# Patient Record
Sex: MALE | Race: OTHER | Employment: FULL TIME | URBAN - METROPOLITAN AREA
[De-identification: names, ages, dates, MRNs, and addresses within clinical notes are randomized per-mention and may not be internally consistent; named-entity substitution may affect disease eponyms.]

---

## 2019-02-15 ENCOUNTER — HOSPITAL ENCOUNTER (EMERGENCY)
Age: 29
Discharge: HOME | End: 2019-02-15
Attending: EMERGENCY MEDICINE
Payer: SELF-PAY

## 2019-02-15 ENCOUNTER — APPOINTMENT (OUTPATIENT)
Dept: ULTRASOUND IMAGING | Age: 29
End: 2019-02-15
Attending: NURSE PRACTITIONER
Payer: SELF-PAY

## 2019-02-15 VITALS
OXYGEN SATURATION: 100 % | BODY MASS INDEX: 25.41 KG/M2 | SYSTOLIC BLOOD PRESSURE: 147 MMHG | HEIGHT: 72 IN | WEIGHT: 187.61 LBS | TEMPERATURE: 98.1 F | RESPIRATION RATE: 16 BRPM | DIASTOLIC BLOOD PRESSURE: 95 MMHG | HEART RATE: 76 BPM

## 2019-02-15 DIAGNOSIS — Q82.9 SKIN ANOMALY: Primary | ICD-10-CM

## 2019-02-15 PROCEDURE — 99282 EMERGENCY DEPT VISIT SF MDM: CPT

## 2019-02-15 PROCEDURE — 76882 US LMTD JT/FCL EVL NVASC XTR: CPT

## 2019-02-15 PROCEDURE — 93971 EXTREMITY STUDY: CPT

## 2019-02-15 RX ORDER — CEPHALEXIN 500 MG/1
500 CAPSULE ORAL 4 TIMES DAILY
Qty: 28 CAP | Refills: 0 | Status: SHIPPED | OUTPATIENT
Start: 2019-02-15 | End: 2019-02-22

## 2019-02-15 NOTE — ED PROVIDER NOTES
EMERGENCY DEPARTMENT HISTORY AND PHYSICAL EXAM 
 
 
Date: 2/15/2019 Patient Name: Loni Rendon History of Presenting Illness Chief Complaint Patient presents with  Leg Pain  
  red inlamed knot on left thigh noticed yesterday morning. History Provided By: Patient HPI: Loni Rendon, 34 y.o. male with no PMHx presents ambulatory from home to the ED with cc of left leg bump that is red and tender to palpation. He first noticed it this am. No known trauma. Family hx positive for VTE. Pt denies fevers, chills, night sweats, chest pain, pressure, SOB, MUNOZ, PND, orthopnea, abdominal pain, n/v/d, melena, hematuria, dysuria, constipation, HA, dizziness, and syncope There are no other complaints, changes, or physical findings at this time. PCP: None No current facility-administered medications on file prior to encounter. No current outpatient medications on file prior to encounter. Past History Past Medical History: No past medical history on file. Past Surgical History: No past surgical history on file. Family History: No family history on file. Social History: 
Social History Tobacco Use  Smoking status: Current Every Day Smoker Substance Use Topics  Alcohol use: Yes  Drug use: Not on file Allergies: 
No Known Allergies Review of Systems Review of Systems Constitutional: Negative for activity change, appetite change, chills, diaphoresis, fatigue, fever and unexpected weight change. HENT: Negative for congestion, ear pain, rhinorrhea, sinus pressure, sore throat, tinnitus, trouble swallowing and voice change. Eyes: Negative for pain, discharge, redness and visual disturbance. Respiratory: Negative for apnea, cough, choking, chest tightness, shortness of breath, wheezing and stridor. Cardiovascular: Negative for chest pain, palpitations and leg swelling. Gastrointestinal: Negative for abdominal pain, constipation, nausea and vomiting. Endocrine: Negative for cold intolerance and heat intolerance. Genitourinary: Negative for difficulty urinating, dysuria, flank pain, hematuria, testicular pain and urgency. Musculoskeletal: Negative for arthralgias, back pain, gait problem, joint swelling, myalgias, neck pain and neck stiffness. Skin: Positive for color change. Negative for pallor, rash and wound. Allergic/Immunologic: Negative for immunocompromised state. Neurological: Negative for dizziness, tremors, syncope, weakness, light-headedness, numbness and headaches. Hematological: Does not bruise/bleed easily. Psychiatric/Behavioral: Negative for agitation, confusion and suicidal ideas. Physical Exam  
Physical Exam  
Constitutional: He is oriented to person, place, and time. He appears well-developed and well-nourished. No distress. HENT:  
Head: Atraumatic. Nose: Nose normal.  
Mouth/Throat: No oropharyngeal exudate. Eyes: Conjunctivae and EOM are normal. Right eye exhibits no discharge. Left eye exhibits no discharge. No scleral icterus. Neck: Normal range of motion. Neck supple. No JVD present. No tracheal deviation present. No thyromegaly present. Cardiovascular: Normal rate and regular rhythm. Exam reveals no gallop and no friction rub. No murmur heard. Pulmonary/Chest: Breath sounds normal. No stridor. No respiratory distress. He has no wheezes. He has no rales. He exhibits no tenderness. Abdominal: Soft. Bowel sounds are normal. He exhibits no distension and no mass. There is no tenderness. There is no rebound and no guarding. Musculoskeletal: Normal range of motion. He exhibits no edema or tenderness. Legs: 
Lymphadenopathy:  
  He has no cervical adenopathy. Neurological: He is alert and oriented to person, place, and time. Coordination normal.  
Skin: Skin is warm and dry. He is not diaphoretic. Psychiatric: He has a normal mood and affect. His behavior is normal.  
Nursing note and vitals reviewed. Diagnostic Study Results Labs - No results found for this or any previous visit (from the past 12 hour(s)). Radiologic Studies -  
US EXT NONVAS LT LTD Final Result IMPRESSION: No deep venous thrombosis or other abnormality identified. CT Results  (Last 48 hours) None CXR Results  (Last 48 hours) None Medical Decision Making I am the first provider for this patient. I reviewed the vital signs, available nursing notes, past medical history, past surgical history, family history and social history. Vital Signs-Reviewed the patient's vital signs. Patient Vitals for the past 12 hrs: 
 Temp Pulse Resp BP SpO2  
02/15/19 1455 98.1 °F (36.7 °C) 76 16 (!) 147/95 100 % Pulse Oximetry Analysis - 100% on RA Cardiac Monitor:  
Rate: 76 bpm 
 
Records Reviewed: Nursing Notes, Old Medical Records, Previous electrocardiograms, Previous Radiology Studies and Previous Laboratory Studies Provider Notes (Medical Decision Making):  
Red dermal changes ED Course:  
Initial assessment performed. The patients presenting problems have been discussed, and they are in agreement with the care plan formulated and outlined with them. I have encouraged them to ask questions as they arise throughout their visit. Stable, ambulatory pt in [facility] Critical Care Time:  
0 Disposition: 
Discharge to home with PCP and Gen Surg follow up PLAN: 
1. Discharge Medication List as of 2/15/2019  5:23 PM  
  
START taking these medications Details  
cephALEXin (KEFLEX) 500 mg capsule Take 1 Cap by mouth four (4) times daily for 7 days. , Print, Disp-28 Cap, R-0  
  
  
 
2. Follow-up Information Follow up With Specialties Details Why Contact Info  Jose Jaramillo MD General Surgery, Breast Surgery, Colon and Rectal Surgery, Endocrinology In 3 days  [address] [facility] 
[phone] [facility] EMERGENCY DEPT Emergency Medicine  As needed, If symptoms worsen [address] [facility] 
[phone] Return to ED if worse Diagnosis Clinical Impression: 1. Skin anomaly Attestations: 
 
Emmanuel Lo NP 
6:24 PM

## 2019-02-15 NOTE — ED NOTES
Also has a knot on right side face right cheek he has had for a while that has come and gone once in 2010 and once in 2015 He would like to get it checked,

## 2019-02-15 NOTE — DISCHARGE INSTRUCTIONS
We hope that we have addressed all of your medical concerns. The examination and treatment you received in the Emergency Department were for an emergent problem and were not intended as complete care. It is important that you follow up with your healthcare provider(s) for ongoing care. If your symptoms worsen or do not improve as expected, and you are unable to reach your usual health care provider(s), you should return to the Emergency Department. [facility] participate in nationally recognized quality of care measures. If your blood pressure is greater than 120/80, as reported below, we urge that you seek medical care to address the potential of high blood pressure, commonly known as hypertension. Hypertension can be hereditary or can be caused by certain medical conditions, pain, stress, or \"white coat syndrome. \"       Please make an appointment with your health care provider(s) for follow up of your Emergency Department visit. VITALS:   Patient Vitals for the past 8 hrs:   Temp Pulse Resp BP SpO2   02/15/19 1455 98.1 °F (36.7 °C) 76 16 (!) 147/95 100 %          Thank you for allowing us to provide you with medical care today. We realize that you have many choices for your emergency care needs. Please choose us in the future for any continued health care needs. Winston Smith NP              No results found for this or any previous visit (from the past 24 hour(s)). [address]    Result Date: 2/15/2019  EXAM:  DUPLEX LOWER EXT VENOUS LEFT, US EXT NONVAS LT LTD INDICATION: Painful knot on the thigh. COMPARISON: None. FINDINGS: Duplex Doppler sonography of the left lower extremity was performed from the groin to the calf. The left common femoral, femoral and popliteal veins are compressible with normal color-flow and wave forms and response to physiologic maneuvers including Valsalva and augmentation.  Sonography of the area of palpable abnormality was performed with a high frequency linear transducer and static images obtained and demonstrates no abnormality. IMPRESSION: No deep venous thrombosis or other abnormality identified.

## 2019-08-13 ENCOUNTER — OFFICE VISIT (OUTPATIENT)
Dept: INTERNAL MEDICINE CLINIC | Age: 29
End: 2019-08-13

## 2019-08-13 VITALS
HEART RATE: 62 BPM | RESPIRATION RATE: 18 BRPM | BODY MASS INDEX: 26.15 KG/M2 | HEIGHT: 72 IN | OXYGEN SATURATION: 98 % | DIASTOLIC BLOOD PRESSURE: 75 MMHG | TEMPERATURE: 98.3 F | WEIGHT: 193.1 LBS | SYSTOLIC BLOOD PRESSURE: 116 MMHG

## 2019-08-13 DIAGNOSIS — L98.9 FACIAL LESION: ICD-10-CM

## 2019-08-13 DIAGNOSIS — Z00.00 PHYSICAL EXAM: Primary | ICD-10-CM

## 2019-08-13 NOTE — PROGRESS NOTES
Chief Complaint   Patient presents with    New Patient     Patient office to have a \"bump\" on his right cheeck examined. He states that the bump has been present since 2010, however, went away for a while, but has since returned. Patient states that the bump does not hurt at all, but, there is a \"tingling\" sensation at times. Patients states that there is a family of blood clots (mother's family) and alcohol abuse (father's family). 1. Have you been to the ER, urgent care clinic since your last visit? Hospitalized since your last visit? No    2. Have you seen or consulted any other health care providers outside of the [address] [place] [address] since your last visit? Include any pap smears or colon screening.  No

## 2019-08-14 LAB
ALBUMIN SERPL-MCNC: 4.8 G/DL (ref 3.5–5.5)
ALBUMIN/GLOB SERPL: 1.8 {RATIO} (ref 1.2–2.2)
ALP SERPL-CCNC: 57 IU/L (ref 39–117)
ALT SERPL-CCNC: 24 IU/L (ref 0–44)
APPEARANCE UR: CLEAR
AST SERPL-CCNC: 15 IU/L (ref 0–40)
BASOPHILS # BLD AUTO: 0 X10E3/UL (ref 0–0.2)
BASOPHILS NFR BLD AUTO: 1 %
BILIRUB SERPL-MCNC: <0.2 MG/DL (ref 0–1.2)
BILIRUB UR QL STRIP: NEGATIVE
BUN SERPL-MCNC: 9 MG/DL (ref 6–20)
BUN/CREAT SERPL: 9 (ref 9–20)
CALCIUM SERPL-MCNC: 10 MG/DL (ref 8.7–10.2)
CHLORIDE SERPL-SCNC: 102 MMOL/L (ref 96–106)
CHOLEST SERPL-MCNC: 185 MG/DL (ref 100–199)
CO2 SERPL-SCNC: 21 MMOL/L (ref 20–29)
COLOR UR: YELLOW
CREAT SERPL-MCNC: 1.03 MG/DL (ref 0.76–1.27)
EOSINOPHIL # BLD AUTO: 0.1 X10E3/UL (ref 0–0.4)
EOSINOPHIL NFR BLD AUTO: 1 %
ERYTHROCYTE [DISTWIDTH] IN BLOOD BY AUTOMATED COUNT: 13.9 % (ref 12.3–15.4)
GLOBULIN SER CALC-MCNC: 2.6 G/DL (ref 1.5–4.5)
GLUCOSE SERPL-MCNC: 99 MG/DL (ref 65–99)
GLUCOSE UR QL: NEGATIVE
HCT VFR BLD AUTO: 43.5 % (ref 37.5–51)
HDLC SERPL-MCNC: 75 MG/DL
HGB BLD-MCNC: 14.4 G/DL (ref 13–17.7)
HGB UR QL STRIP: NEGATIVE
IMM GRANULOCYTES # BLD AUTO: 0 X10E3/UL (ref 0–0.1)
IMM GRANULOCYTES NFR BLD AUTO: 0 %
KETONES UR QL STRIP: NEGATIVE
LDLC SERPL CALC-MCNC: 96 MG/DL (ref 0–99)
LEUKOCYTE ESTERASE UR QL STRIP: NEGATIVE
LYMPHOCYTES # BLD AUTO: 3 X10E3/UL (ref 0.7–3.1)
LYMPHOCYTES NFR BLD AUTO: 60 %
MCH RBC QN AUTO: 27.1 PG (ref 26.6–33)
MCHC RBC AUTO-ENTMCNC: 33.1 G/DL (ref 31.5–35.7)
MCV RBC AUTO: 82 FL (ref 79–97)
MICRO URNS: NORMAL
MONOCYTES # BLD AUTO: 0 X10E3/UL (ref 0.1–0.9)
MONOCYTES NFR BLD AUTO: 0 %
NEUTROPHILS # BLD AUTO: 1.9 X10E3/UL (ref 1.4–7)
NEUTROPHILS NFR BLD AUTO: 38 %
NITRITE UR QL STRIP: NEGATIVE
PH UR STRIP: 7 [PH] (ref 5–7.5)
PLATELET # BLD AUTO: 272 X10E3/UL (ref 150–450)
POTASSIUM SERPL-SCNC: 4.7 MMOL/L (ref 3.5–5.2)
PROT SERPL-MCNC: 7.4 G/DL (ref 6–8.5)
PROT UR QL STRIP: NEGATIVE
RBC # BLD AUTO: 5.32 X10E6/UL (ref 4.14–5.8)
SODIUM SERPL-SCNC: 138 MMOL/L (ref 134–144)
SP GR UR: 1.02 (ref 1–1.03)
TRIGL SERPL-MCNC: 68 MG/DL (ref 0–149)
UROBILINOGEN UR STRIP-MCNC: 0.2 MG/DL (ref 0.2–1)
VLDLC SERPL CALC-MCNC: 14 MG/DL (ref 5–40)
WBC # BLD AUTO: 5 X10E3/UL (ref 3.4–10.8)

## 2019-08-18 PROBLEM — L98.9 FACIAL LESION: Status: ACTIVE | Noted: 2019-08-18

## 2019-08-18 NOTE — PROGRESS NOTES
[address] [facility]  [place] [address]  [address] [place] [address]  Phone:  [phone]  Fax: [phone]       Chief Complaint   Patient presents with    New Patient     Patient office to have a \"bump\" on his right cheeck examined. He states that the bump has been present since 2010, however, went away for a while, but has since returned. Patient states that the bump does not hurt at all, but, there is a \"tingling\" sensation at times. Patients states that there is a family of blood clots (mother's family) and alcohol abuse (father's family). .      SUBJECTIVE:    Vasiliy Benedict is a 34 y.o. male Comes in as a new patient. He comes in for physical examination. Chief complaint, however is a lesion on the right side of his jaw, which he has had now for well  over a year. He states it is enlarging slightly. In reality he has had it for several years, but it decreased in size for a period of time, only to recur again. It is non tender. There is no history of trauma. Past Medical History:   Diagnosis Date    Psychotic disorder [facility])     Requiring hospitalization short-term------ age 32     History reviewed. No pertinent surgical history.   No Known Allergies      REVIEW OF SYSTEMS:  General: negative for - chills or fever  ENT: negative for - headaches, nasal congestion or tinnitus  Respiratory: negative for - cough, hemoptysis, shortness of breath or wheezing  Cardiovascular : negative for - chest pain, edema, palpitations or shortness of breath  Gastrointestinal: negative for - abdominal pain, blood in stools, heartburn or nausea/vomiting  Genito-Urinary: no dysuria, trouble voiding, or hematuria  Musculoskeletal: negative for - gait disturbance, joint pain, joint stiffness or joint swelling  Neurological: no TIA or stroke symptoms  Hematologic: no bruises, no bleeding, no swollen glands  Integument: no lumps, mole changes, nail changes or rash  Endocrine: no malaise/lethargy or unexpected weight changes      Social History     Socioeconomic History    Marital status: SINGLE     Spouse name: Not on file    Number of children: 3    Years of education: Not on file    Highest education level: Not on file   Occupational History    Occupation: Unemployed   Tobacco Use    Smoking status: Former Smoker    Smokeless tobacco: Never Used   Substance and Sexual Activity    Alcohol use: Yes     Comment: social    Drug use: Not Currently    Sexual activity: Yes     Partners: Female     Birth control/protection: Condom     Family History   Problem Relation Age of Onset    Seizures Mother        OBJECTIVE:    Visit Vitals  /75   Pulse 62   Temp 98.3 °F (36.8 °C) (Oral)   Resp 18   Ht 6' (1.829 m)   Wt 193 lb 1.6 oz (87.6 kg)   SpO2 98%   BMI 26.19 kg/m²     CONSTITUTIONAL: well , well nourished, appears age appropriate  EYES: perrla, eom intact  ENMT:moist mucous membranes, pharynx clear, firm nodular structure right side of jaw approximately 1 cm in diameter, nontender  NECK: supple. Thyroid normal  RESPIRATORY: Chest: clear to ascultation and percussion   CARDIOVASCULAR: Heart: regular rate and rhythm  GASTROINTESTINAL: Abdomen: soft, bowel sounds active  HEMATOLOGIC: no pathological lymph nodes palpated  MUSCULOSKELETAL: Extremities: no edema, pulse 1+   INTEGUMENT: No unusual rashes or suspicious skin lesions noted. Nails appear normal.  NEUROLOGIC: non-focal exam   MENTAL STATUS: alert and oriented, appropriate affect      ASSESSMENT:  1. Physical exam    2. Facial lesion        PLAN:    1. The patient's general physical health appears to be good. 2. He will be referred to ENT physician regarding the facial lesion. I am not entirely sure of the origin. This could be indirectly related to a plugged duct from a salivary gland.     .  Orders Placed This Encounter    CBC WITH AUTOMATED DIFF    LIPID PANEL    METABOLIC PANEL, COMPREHENSIVE    URINALYSIS W/ RFLX Latia Macedo MD

## 2020-03-16 ENCOUNTER — OFFICE VISIT (OUTPATIENT)
Dept: INTERNAL MEDICINE CLINIC | Age: 30
End: 2020-03-16

## 2020-03-16 VITALS
DIASTOLIC BLOOD PRESSURE: 77 MMHG | HEIGHT: 72 IN | RESPIRATION RATE: 18 BRPM | HEART RATE: 82 BPM | WEIGHT: 199.5 LBS | OXYGEN SATURATION: 98 % | BODY MASS INDEX: 27.02 KG/M2 | TEMPERATURE: 98.6 F | SYSTOLIC BLOOD PRESSURE: 128 MMHG

## 2020-03-16 DIAGNOSIS — L98.9 FACIAL LESION: ICD-10-CM

## 2020-03-16 DIAGNOSIS — F32.9 REACTIVE DEPRESSION: Primary | ICD-10-CM

## 2020-03-16 NOTE — LETTER
3/16/2020 1:11 PM 
 
Mr. Pérez Gonzales [address] [facility] [address] Patient has a history of depression. Currently has an emotional support animal which is assisting with his current psychological status in a positive manner. This needs to continue. Sincerely, Jatin Collins MD

## 2020-03-16 NOTE — PROGRESS NOTES
Chief Complaint   Patient presents with    Other     Patient requesting for housing so that he may keep his pet for emotional support. 1. Have you been to the ER, urgent care clinic since your last visit? Hospitalized since your last visit? No    2. Have you seen or consulted any other health care providers outside of the [address] [place] [address] since your last visit? Include any pap smears or colon screening.  No

## 2020-03-16 NOTE — PROGRESS NOTES
Chief Complaint   Patient presents with    Other     Patient requesting for housing so that he may keep his pet for emotional support. .      SUBECTIVE:    Usman Johnson is a 27 y.o. male Comes in for return visit stating that he has a history of depression, having been hospitalized at [facility] several years ago. He stopped taking his antidepressant, which was Wellbutrin, because it did not agree with him. He has been using other homeopathic measures and has done reasonably well. One of the things he has been using is an emotional support animal, which is a dog he has raised. He is living in an apartment and needs a statement stating that he needs to continue with the presence of the animal in view of his psychological status. He has a lesion on his jaw that he needs to have repaired, but he has to wait until he gets insurance. Past Medical History:   Diagnosis Date    Psychotic disorder [facility])     Requiring hospitalization short-term------ age 32     History reviewed. No pertinent surgical history.   No Known Allergies    REVIEW OF SYSTEMS:  Review of Systems - Negative except   ENT ROS: negative for - headaches, hearing change, nasal congestion, oral lesions, tinnitus, visual changes or   Respiratory ROS: no cough, shortness of breath, or wheezing  Cardiovascular ROS: no chest pain or dyspnea on exertion  Gastrointestinal ROS: no abdominal pain, change in bowel habits, or black or blood  Genito-Urinary ROS: no dysuria, trouble voiding, or hematuria  Musculoskeletal ROS: negative  Neurological ROS: no TIA or stroke symptoms      Social History     Socioeconomic History    Marital status: UNKNOWN     Spouse name: Not on file    Number of children: 4    Years of education: Not on file    Highest education level: Not on file   Occupational History    Occupation: Unemployed   Tobacco Use    Smoking status: Former Smoker    Smokeless tobacco: Never Used   Substance and Sexual Activity    Alcohol use: Yes     Comment: social    Drug use: Not Currently    Sexual activity: Yes     Partners: Female     Birth control/protection: Condom   r  Family History   Problem Relation Age of Onset    Seizures Mother        OBJECTIVE:  Visit Vitals  /77   Pulse 82   Temp 98.6 °F (37 °C) (Oral)   Resp 18   Ht 6' (1.829 m)   Wt 199 lb 8 oz (90.5 kg)   SpO2 98%   BMI 27.06 kg/m²     ENT: perrla,  eom intact, nodule right side of face just above the mandible  NECK: supple. Thyroid normal, no JVD  CHEST: clear to ascultation and percussion   HEART: regular rate and rhythm  ABD: soft, bowel sounds active,   EXTREMITIES: no edema, pulse 1+  INTEGUMENT: clear      ASSESSMENT:  1. Reactive depression    2. Facial lesion        PLAN:    1. The patient has a history of depression and appears to be reasonably stable. It appears that the support animal is having some meaningful impact on his emotional status. A letter will be dictated regarding this. 2. He needs to get the lesion removed from his face, which he plans to do in the near future. Follow-up and Dispositions    · Return in about 6 months (around 9/16/2020).            Courtney Medina MD

## 2020-08-07 ENCOUNTER — HOSPITAL ENCOUNTER (OUTPATIENT)
Dept: PREADMISSION TESTING | Age: 30
Discharge: HOME | End: 2020-08-07
Payer: COMMERCIAL

## 2020-08-07 VITALS
HEIGHT: 72 IN | TEMPERATURE: 98.4 F | SYSTOLIC BLOOD PRESSURE: 138 MMHG | DIASTOLIC BLOOD PRESSURE: 77 MMHG | OXYGEN SATURATION: 99 % | HEART RATE: 71 BPM | RESPIRATION RATE: 18 BRPM | BODY MASS INDEX: 26.87 KG/M2 | WEIGHT: 198.41 LBS

## 2020-08-07 LAB
BASOPHILS # BLD: 0 K/UL (ref 0–0.1)
BASOPHILS NFR BLD: 1 % (ref 0–1)
DIFFERENTIAL METHOD BLD: NORMAL
EOSINOPHIL # BLD: 0.1 K/UL (ref 0–0.4)
EOSINOPHIL NFR BLD: 1 % (ref 0–7)
ERYTHROCYTE [DISTWIDTH] IN BLOOD BY AUTOMATED COUNT: 12.6 % (ref 11.5–14.5)
HCT VFR BLD AUTO: 41.8 % (ref 36.6–50.3)
HGB BLD-MCNC: 13.2 G/DL (ref 12.1–17)
IMM GRANULOCYTES # BLD AUTO: 0 K/UL (ref 0–0.04)
IMM GRANULOCYTES NFR BLD AUTO: 0 % (ref 0–0.5)
LYMPHOCYTES # BLD: 2 K/UL (ref 0.8–3.5)
LYMPHOCYTES NFR BLD: 29 % (ref 12–49)
MCH RBC QN AUTO: 26.4 PG (ref 26–34)
MCHC RBC AUTO-ENTMCNC: 31.6 G/DL (ref 30–36.5)
MCV RBC AUTO: 83.6 FL (ref 80–99)
MONOCYTES # BLD: 0.7 K/UL (ref 0–1)
MONOCYTES NFR BLD: 11 % (ref 5–13)
NEUTS SEG # BLD: 4 K/UL (ref 1.8–8)
NEUTS SEG NFR BLD: 58 % (ref 32–75)
NRBC # BLD: 0 K/UL (ref 0–0.01)
NRBC BLD-RTO: 0 PER 100 WBC
PLATELET # BLD AUTO: 284 K/UL (ref 150–400)
PMV BLD AUTO: 10.6 FL (ref 8.9–12.9)
RBC # BLD AUTO: 5 M/UL (ref 4.1–5.7)
WBC # BLD AUTO: 6.9 K/UL (ref 4.1–11.1)

## 2020-08-07 PROCEDURE — 85025 COMPLETE CBC W/AUTO DIFF WBC: CPT

## 2020-08-07 RX ORDER — ASCORBIC ACID 250 MG
250 TABLET ORAL DAILY
COMMUNITY
End: 2022-03-23

## 2020-08-13 ENCOUNTER — HOSPITAL ENCOUNTER (OUTPATIENT)
Dept: PREADMISSION TESTING | Age: 30
Discharge: HOME | End: 2020-08-13
Payer: COMMERCIAL

## 2020-08-13 DIAGNOSIS — Z01.812 PRE-PROCEDURE LAB EXAM: ICD-10-CM

## 2020-08-13 PROCEDURE — 87635 SARS-COV-2 COVID-19 AMP PRB: CPT

## 2020-08-14 LAB — SARS-COV-2, COV2NT: NOT DETECTED

## 2020-08-16 ENCOUNTER — ANESTHESIA EVENT (OUTPATIENT)
Dept: SURGERY | Age: 30
End: 2020-08-16
Payer: COMMERCIAL

## 2020-08-17 ENCOUNTER — HOSPITAL ENCOUNTER (OUTPATIENT)
Age: 30
Setting detail: OUTPATIENT SURGERY
Discharge: HOME | End: 2020-08-17
Attending: OTOLARYNGOLOGY | Admitting: OTOLARYNGOLOGY
Payer: COMMERCIAL

## 2020-08-17 ENCOUNTER — ANESTHESIA (OUTPATIENT)
Dept: SURGERY | Age: 30
End: 2020-08-17
Payer: COMMERCIAL

## 2020-08-17 VITALS
BODY MASS INDEX: 26.87 KG/M2 | SYSTOLIC BLOOD PRESSURE: 116 MMHG | TEMPERATURE: 97.7 F | HEART RATE: 81 BPM | HEIGHT: 72 IN | OXYGEN SATURATION: 98 % | WEIGHT: 198.41 LBS | DIASTOLIC BLOOD PRESSURE: 84 MMHG | RESPIRATION RATE: 22 BRPM

## 2020-08-17 PROCEDURE — 76010000138 HC OR TIME 0.5 TO 1 HR: Performed by: OTOLARYNGOLOGY

## 2020-08-17 PROCEDURE — 77030019908 HC STETH ESOPH SIMS -A: Performed by: ANESTHESIOLOGY

## 2020-08-17 PROCEDURE — 74011000250 HC RX REV CODE- 250: Performed by: OTOLARYNGOLOGY

## 2020-08-17 PROCEDURE — 74011250636 HC RX REV CODE- 250/636: Performed by: NURSE ANESTHETIST, CERTIFIED REGISTERED

## 2020-08-17 PROCEDURE — 77030040922 HC BLNKT HYPOTHRM STRY -A

## 2020-08-17 PROCEDURE — 74011000258 HC RX REV CODE- 258: Performed by: NURSE ANESTHETIST, CERTIFIED REGISTERED

## 2020-08-17 PROCEDURE — 76210000006 HC OR PH I REC 0.5 TO 1 HR: Performed by: OTOLARYNGOLOGY

## 2020-08-17 PROCEDURE — 74011250637 HC RX REV CODE- 250/637: Performed by: ANESTHESIOLOGY

## 2020-08-17 PROCEDURE — 77030031139 HC SUT VCRL2 J&J -A: Performed by: OTOLARYNGOLOGY

## 2020-08-17 PROCEDURE — 77030008683 HC TU ET CUF COVD -A: Performed by: ANESTHESIOLOGY

## 2020-08-17 PROCEDURE — 74011250636 HC RX REV CODE- 250/636: Performed by: ANESTHESIOLOGY

## 2020-08-17 PROCEDURE — 88304 TISSUE EXAM BY PATHOLOGIST: CPT

## 2020-08-17 PROCEDURE — 77030040356 HC CORD BPLR FRCP COVD -A: Performed by: OTOLARYNGOLOGY

## 2020-08-17 PROCEDURE — 77030040361 HC SLV COMPR DVT MDII -B: Performed by: OTOLARYNGOLOGY

## 2020-08-17 PROCEDURE — 77030019615 HC ELCTRD EMG NDL MEDT -B: Performed by: OTOLARYNGOLOGY

## 2020-08-17 PROCEDURE — 76210000020 HC REC RM PH II FIRST 0.5 HR: Performed by: OTOLARYNGOLOGY

## 2020-08-17 PROCEDURE — 74011250636 HC RX REV CODE- 250/636: Performed by: OTOLARYNGOLOGY

## 2020-08-17 PROCEDURE — 77030002974 HC SUT PLN J&J -A: Performed by: OTOLARYNGOLOGY

## 2020-08-17 PROCEDURE — 76060000032 HC ANESTHESIA 0.5 TO 1 HR: Performed by: OTOLARYNGOLOGY

## 2020-08-17 PROCEDURE — 74011000250 HC RX REV CODE- 250: Performed by: NURSE ANESTHETIST, CERTIFIED REGISTERED

## 2020-08-17 RX ORDER — SODIUM CHLORIDE 0.9 % (FLUSH) 0.9 %
5-40 SYRINGE (ML) INJECTION AS NEEDED
Status: DISCONTINUED | OUTPATIENT
Start: 2020-08-17 | End: 2020-08-17 | Stop reason: HOSPADM

## 2020-08-17 RX ORDER — MIDAZOLAM HYDROCHLORIDE 1 MG/ML
INJECTION, SOLUTION INTRAMUSCULAR; INTRAVENOUS AS NEEDED
Status: DISCONTINUED | OUTPATIENT
Start: 2020-08-17 | End: 2020-08-17 | Stop reason: HOSPADM

## 2020-08-17 RX ORDER — FENTANYL CITRATE 50 UG/ML
INJECTION, SOLUTION INTRAMUSCULAR; INTRAVENOUS AS NEEDED
Status: DISCONTINUED | OUTPATIENT
Start: 2020-08-17 | End: 2020-08-17 | Stop reason: HOSPADM

## 2020-08-17 RX ORDER — SODIUM CHLORIDE 0.9 % (FLUSH) 0.9 %
5-40 SYRINGE (ML) INJECTION EVERY 8 HOURS
Status: DISCONTINUED | OUTPATIENT
Start: 2020-08-17 | End: 2020-08-17 | Stop reason: HOSPADM

## 2020-08-17 RX ORDER — SODIUM CHLORIDE, SODIUM LACTATE, POTASSIUM CHLORIDE, CALCIUM CHLORIDE 600; 310; 30; 20 MG/100ML; MG/100ML; MG/100ML; MG/100ML
1000 INJECTION, SOLUTION INTRAVENOUS CONTINUOUS
Status: DISCONTINUED | OUTPATIENT
Start: 2020-08-17 | End: 2020-08-17 | Stop reason: HOSPADM

## 2020-08-17 RX ORDER — LIDOCAINE HYDROCHLORIDE 20 MG/ML
INJECTION, SOLUTION EPIDURAL; INFILTRATION; INTRACAUDAL; PERINEURAL AS NEEDED
Status: DISCONTINUED | OUTPATIENT
Start: 2020-08-17 | End: 2020-08-17 | Stop reason: HOSPADM

## 2020-08-17 RX ORDER — DIPHENHYDRAMINE HYDROCHLORIDE 50 MG/ML
12.5 INJECTION, SOLUTION INTRAMUSCULAR; INTRAVENOUS AS NEEDED
Status: DISCONTINUED | OUTPATIENT
Start: 2020-08-17 | End: 2020-08-17 | Stop reason: HOSPADM

## 2020-08-17 RX ORDER — OXYCODONE HYDROCHLORIDE 5 MG/1
5 TABLET ORAL AS NEEDED
Status: DISCONTINUED | OUTPATIENT
Start: 2020-08-17 | End: 2020-08-18 | Stop reason: HOSPADM

## 2020-08-17 RX ORDER — MORPHINE SULFATE 2 MG/ML
2 INJECTION, SOLUTION INTRAMUSCULAR; INTRAVENOUS
Status: CANCELLED | OUTPATIENT
Start: 2020-08-17

## 2020-08-17 RX ORDER — ACETAMINOPHEN 325 MG/1
650 TABLET ORAL ONCE
Status: COMPLETED | OUTPATIENT
Start: 2020-08-17 | End: 2020-08-17

## 2020-08-17 RX ORDER — MIDAZOLAM HYDROCHLORIDE 1 MG/ML
1 INJECTION, SOLUTION INTRAMUSCULAR; INTRAVENOUS AS NEEDED
Status: DISCONTINUED | OUTPATIENT
Start: 2020-08-17 | End: 2020-08-17 | Stop reason: HOSPADM

## 2020-08-17 RX ORDER — ONDANSETRON 2 MG/ML
INJECTION INTRAMUSCULAR; INTRAVENOUS AS NEEDED
Status: DISCONTINUED | OUTPATIENT
Start: 2020-08-17 | End: 2020-08-17 | Stop reason: HOSPADM

## 2020-08-17 RX ORDER — CEFAZOLIN SODIUM/WATER 2 G/20 ML
2 SYRINGE (ML) INTRAVENOUS ONCE
Status: COMPLETED | OUTPATIENT
Start: 2020-08-17 | End: 2020-08-17

## 2020-08-17 RX ORDER — FENTANYL CITRATE 50 UG/ML
25 INJECTION, SOLUTION INTRAMUSCULAR; INTRAVENOUS
Status: DISCONTINUED | OUTPATIENT
Start: 2020-08-17 | End: 2020-08-18 | Stop reason: HOSPADM

## 2020-08-17 RX ORDER — EPHEDRINE SULFATE/0.9% NACL/PF 50 MG/5 ML
5 SYRINGE (ML) INTRAVENOUS AS NEEDED
Status: DISCONTINUED | OUTPATIENT
Start: 2020-08-17 | End: 2020-08-18 | Stop reason: HOSPADM

## 2020-08-17 RX ORDER — HYDROMORPHONE HYDROCHLORIDE 1 MG/ML
0.2 INJECTION, SOLUTION INTRAMUSCULAR; INTRAVENOUS; SUBCUTANEOUS
Status: DISCONTINUED | OUTPATIENT
Start: 2020-08-17 | End: 2020-08-17 | Stop reason: HOSPADM

## 2020-08-17 RX ORDER — SODIUM CHLORIDE, SODIUM LACTATE, POTASSIUM CHLORIDE, CALCIUM CHLORIDE 600; 310; 30; 20 MG/100ML; MG/100ML; MG/100ML; MG/100ML
100 INJECTION, SOLUTION INTRAVENOUS CONTINUOUS
Status: DISCONTINUED | OUTPATIENT
Start: 2020-08-17 | End: 2020-08-18 | Stop reason: HOSPADM

## 2020-08-17 RX ORDER — SODIUM CHLORIDE 9 MG/ML
25 INJECTION, SOLUTION INTRAVENOUS CONTINUOUS
Status: DISCONTINUED | OUTPATIENT
Start: 2020-08-17 | End: 2020-08-18 | Stop reason: HOSPADM

## 2020-08-17 RX ORDER — KETOROLAC TROMETHAMINE 30 MG/ML
INJECTION, SOLUTION INTRAMUSCULAR; INTRAVENOUS AS NEEDED
Status: DISCONTINUED | OUTPATIENT
Start: 2020-08-17 | End: 2020-08-17 | Stop reason: HOSPADM

## 2020-08-17 RX ORDER — SODIUM CHLORIDE 0.9 % (FLUSH) 0.9 %
5-40 SYRINGE (ML) INJECTION AS NEEDED
Status: CANCELLED | OUTPATIENT
Start: 2020-08-17

## 2020-08-17 RX ORDER — OXYCODONE AND ACETAMINOPHEN 5; 325 MG/1; MG/1
1 TABLET ORAL
Status: CANCELLED | OUTPATIENT
Start: 2020-08-17

## 2020-08-17 RX ORDER — ROPIVACAINE HYDROCHLORIDE 5 MG/ML
150 INJECTION, SOLUTION EPIDURAL; INFILTRATION; PERINEURAL AS NEEDED
Status: DISCONTINUED | OUTPATIENT
Start: 2020-08-17 | End: 2020-08-17 | Stop reason: HOSPADM

## 2020-08-17 RX ORDER — MIDAZOLAM HYDROCHLORIDE 1 MG/ML
0.5 INJECTION, SOLUTION INTRAMUSCULAR; INTRAVENOUS
Status: DISCONTINUED | OUTPATIENT
Start: 2020-08-17 | End: 2020-08-18 | Stop reason: HOSPADM

## 2020-08-17 RX ORDER — SUCCINYLCHOLINE CHLORIDE 20 MG/ML
INJECTION INTRAMUSCULAR; INTRAVENOUS AS NEEDED
Status: DISCONTINUED | OUTPATIENT
Start: 2020-08-17 | End: 2020-08-17 | Stop reason: HOSPADM

## 2020-08-17 RX ORDER — PROPOFOL 10 MG/ML
INJECTION, EMULSION INTRAVENOUS AS NEEDED
Status: DISCONTINUED | OUTPATIENT
Start: 2020-08-17 | End: 2020-08-17 | Stop reason: HOSPADM

## 2020-08-17 RX ORDER — ONDANSETRON 2 MG/ML
4 INJECTION INTRAMUSCULAR; INTRAVENOUS AS NEEDED
Status: DISCONTINUED | OUTPATIENT
Start: 2020-08-17 | End: 2020-08-18 | Stop reason: HOSPADM

## 2020-08-17 RX ORDER — DEXAMETHASONE SODIUM PHOSPHATE 4 MG/ML
INJECTION, SOLUTION INTRA-ARTICULAR; INTRALESIONAL; INTRAMUSCULAR; INTRAVENOUS; SOFT TISSUE AS NEEDED
Status: DISCONTINUED | OUTPATIENT
Start: 2020-08-17 | End: 2020-08-17 | Stop reason: HOSPADM

## 2020-08-17 RX ORDER — ROCURONIUM BROMIDE 10 MG/ML
INJECTION, SOLUTION INTRAVENOUS AS NEEDED
Status: DISCONTINUED | OUTPATIENT
Start: 2020-08-17 | End: 2020-08-17 | Stop reason: HOSPADM

## 2020-08-17 RX ORDER — FENTANYL CITRATE 50 UG/ML
50 INJECTION, SOLUTION INTRAMUSCULAR; INTRAVENOUS AS NEEDED
Status: DISCONTINUED | OUTPATIENT
Start: 2020-08-17 | End: 2020-08-17 | Stop reason: HOSPADM

## 2020-08-17 RX ORDER — MORPHINE SULFATE 10 MG/ML
2 INJECTION, SOLUTION INTRAMUSCULAR; INTRAVENOUS
Status: DISCONTINUED | OUTPATIENT
Start: 2020-08-17 | End: 2020-08-18 | Stop reason: HOSPADM

## 2020-08-17 RX ORDER — LIDOCAINE HYDROCHLORIDE 10 MG/ML
0.1 INJECTION, SOLUTION EPIDURAL; INFILTRATION; INTRACAUDAL; PERINEURAL AS NEEDED
Status: DISCONTINUED | OUTPATIENT
Start: 2020-08-17 | End: 2020-08-17 | Stop reason: HOSPADM

## 2020-08-17 RX ORDER — LIDOCAINE HYDROCHLORIDE AND EPINEPHRINE 10; 10 MG/ML; UG/ML
INJECTION, SOLUTION INFILTRATION; PERINEURAL AS NEEDED
Status: DISCONTINUED | OUTPATIENT
Start: 2020-08-17 | End: 2020-08-17 | Stop reason: HOSPADM

## 2020-08-17 RX ORDER — SODIUM CHLORIDE, SODIUM LACTATE, POTASSIUM CHLORIDE, CALCIUM CHLORIDE 600; 310; 30; 20 MG/100ML; MG/100ML; MG/100ML; MG/100ML
INJECTION, SOLUTION INTRAVENOUS
Status: DISCONTINUED | OUTPATIENT
Start: 2020-08-17 | End: 2020-08-17 | Stop reason: HOSPADM

## 2020-08-17 RX ORDER — ONDANSETRON 2 MG/ML
4 INJECTION INTRAMUSCULAR; INTRAVENOUS
Status: CANCELLED | OUTPATIENT
Start: 2020-08-17

## 2020-08-17 RX ORDER — SODIUM CHLORIDE 0.9 % (FLUSH) 0.9 %
5-40 SYRINGE (ML) INJECTION EVERY 8 HOURS
Status: CANCELLED | OUTPATIENT
Start: 2020-08-17

## 2020-08-17 RX ORDER — SODIUM CHLORIDE 9 MG/ML
25 INJECTION, SOLUTION INTRAVENOUS CONTINUOUS
Status: DISCONTINUED | OUTPATIENT
Start: 2020-08-17 | End: 2020-08-17 | Stop reason: HOSPADM

## 2020-08-17 RX ADMIN — DEXAMETHASONE SODIUM PHOSPHATE 4 MG: 4 INJECTION, SOLUTION INTRAMUSCULAR; INTRAVENOUS at 16:25

## 2020-08-17 RX ADMIN — DEXMEDETOMIDINE HYDROCHLORIDE 8 MCG: 100 INJECTION, SOLUTION, CONCENTRATE INTRAVENOUS at 16:42

## 2020-08-17 RX ADMIN — ONDANSETRON HYDROCHLORIDE 4 MG: 2 INJECTION, SOLUTION INTRAMUSCULAR; INTRAVENOUS at 16:25

## 2020-08-17 RX ADMIN — FENTANYL CITRATE 100 MCG: 50 INJECTION, SOLUTION INTRAMUSCULAR; INTRAVENOUS at 16:16

## 2020-08-17 RX ADMIN — ROCURONIUM BROMIDE 5 MG: 10 SOLUTION INTRAVENOUS at 16:16

## 2020-08-17 RX ADMIN — MIDAZOLAM 2 MG: 1 INJECTION INTRAMUSCULAR; INTRAVENOUS at 16:05

## 2020-08-17 RX ADMIN — ACETAMINOPHEN 650 MG: 325 TABLET ORAL at 14:01

## 2020-08-17 RX ADMIN — Medication 2 G: at 16:27

## 2020-08-17 RX ADMIN — KETOROLAC TROMETHAMINE 30 MG: 30 INJECTION, SOLUTION INTRAMUSCULAR; INTRAVENOUS at 16:44

## 2020-08-17 RX ADMIN — LIDOCAINE HYDROCHLORIDE 80 MG: 20 INJECTION, SOLUTION EPIDURAL; INFILTRATION; INTRACAUDAL; PERINEURAL at 16:16

## 2020-08-17 RX ADMIN — MIDAZOLAM 2 MG: 1 INJECTION INTRAMUSCULAR; INTRAVENOUS at 16:08

## 2020-08-17 RX ADMIN — PROPOFOL 250 MG: 10 INJECTION, EMULSION INTRAVENOUS at 16:16

## 2020-08-17 RX ADMIN — SODIUM CHLORIDE, SODIUM LACTATE, POTASSIUM CHLORIDE, AND CALCIUM CHLORIDE 1000 ML: 600; 310; 30; 20 INJECTION, SOLUTION INTRAVENOUS at 14:00

## 2020-08-17 RX ADMIN — SUCCINYLCHOLINE CHLORIDE 160 MG: 20 INJECTION, SOLUTION INTRAMUSCULAR; INTRAVENOUS at 16:16

## 2020-08-17 RX ADMIN — DEXMEDETOMIDINE HYDROCHLORIDE 8 MCG: 100 INJECTION, SOLUTION, CONCENTRATE INTRAVENOUS at 16:25

## 2020-08-17 RX ADMIN — DEXMEDETOMIDINE HYDROCHLORIDE 8 MCG: 100 INJECTION, SOLUTION, CONCENTRATE INTRAVENOUS at 16:28

## 2020-08-17 RX ADMIN — SODIUM CHLORIDE, POTASSIUM CHLORIDE, SODIUM LACTATE AND CALCIUM CHLORIDE: 600; 310; 30; 20 INJECTION, SOLUTION INTRAVENOUS at 16:10

## 2020-08-17 NOTE — PERIOP NOTES
D/C Instruction provided to the pt's wife Heaven Daniels [phone] who is waiting in the cell phone lot. All questions were answered @ this time.

## 2020-08-17 NOTE — ANESTHESIA PREPROCEDURE EVALUATION
Relevant Problems   No relevant active problems       Anesthetic History   No history of anesthetic complications            Review of Systems / Medical History  Patient summary reviewed, nursing notes reviewed and pertinent labs reviewed    Pulmonary          Smoker         Neuro/Psych         Headaches and psychiatric history     Cardiovascular  Within defined limits                     GI/Hepatic/Renal  Within defined limits              Endo/Other  Within defined limits           Other Findings              Physical Exam    Airway  Mallampati: II  TM Distance: > 6 cm  Neck ROM: normal range of motion   Mouth opening: Normal     Cardiovascular  Regular rate and rhythm,  S1 and S2 normal,  no murmur, click, rub, or gallop             Dental  No notable dental hx       Pulmonary  Breath sounds clear to auscultation               Abdominal  GI exam deferred       Other Findings            Anesthetic Plan    ASA: 2  Anesthesia type: general            Anesthetic plan and risks discussed with: Patient

## 2020-08-17 NOTE — H&P
History and Physical    Subjective:     Eunice Longoria is a 27 y.o.  male who presents with right cheek cyst.  Infected multiple times. Recently drained. Formerly apricot sized. .       Past Medical History:   Diagnosis Date    Migraine     Psychiatric disorder     DEPRESSION AND ANXIETY    Psychotic disorder ([facility] [address]     Requiring hospitalization short-term------ age 32      Past Surgical History:   Procedure Laterality Date    HX ORTHOPAEDIC  2020    REATTACHMENT LEFT PECTORAL MUSCLE     Family History   Problem Relation Age of Onset    Seizures Mother       Social History     Tobacco Use    Smoking status: Former Smoker    Smokeless tobacco: Never Used   Substance Use Topics    Alcohol use: Yes     Comment: social       Prior to Admission medications    Medication Sig Start Date End Date Taking? Authorizing Provider   ascorbic acid, vitamin C, (Vitamin C) 250 mg tablet Take 250 mg by mouth daily. Yes Provider, Historical     No Known Allergies     Review of Systems:  A comprehensive review of systems was negative except for that written in the History of Present Illness. Objective: Intake and Output:    No intake/output data recorded. No intake/output data recorded. Physical Exam:   Visit Vitals  /78 (BP 1 Location: Left arm, BP Patient Position: At rest)   Pulse (!) 59   Temp 98.8 °F (37.1 °C)   Resp 14   Ht 6' (1.829 m)   Wt 90 kg (198 lb 6.6 oz)   SpO2 99%   BMI 26.91 kg/m²     General:  Alert, cooperative, no distress, appears stated age. Head:  Pecan-sized right mid facial mass. Overlying punctum. Eyes:  Conjunctivae/corneas clear. PERRL, EOMs intact. Fundi benign   Ears:  Normal TMs and external ear canals both ears. Nose: Nares normal. Septum midline. Mucosa normal. No drainage or sinus tenderness.    Throat: Lips, mucosa, and tongue normal. Teeth and gums normal.   Neck: Supple, symmetrical, trachea midline, no adenopathy, thyroid: no enlargement/tenderness/nodules, no carotid bruit and no JVD. Back:   Symmetric, no curvature. ROM normal. No CVA tenderness. Lungs:   Clear to auscultation bilaterally. Chest wall:  No tenderness or deformity. Heart:  Regular rate and rhythm, S1, S2 normal, no murmur, click, rub or gallop. Extremities: Extremities normal, atraumatic, no cyanosis or edema. Pulses: 2+ and symmetric all extremities. Skin: Skin color, texture, turgor normal. No rashes or lesions   Lymph nodes: Cervical, supraclavicular, and axillary nodes normal.   Neurologic: CNII-XII intact. Normal strength, sensation and reflexes throughout. Data Review:   No results found for this or any previous visit (from the past 24 hour(s)). Assessment:     Right facial cyst    Plan:     Excision and repair facial cyst  Nerve monitoring.      Signed By: Mic Del Toro MD     August 17, 2020

## 2020-08-17 NOTE — ANESTHESIA POSTPROCEDURE EVALUATION
Post-Anesthesia Evaluation and Assessment    Patient: Dylon Dolan MRN: [id number]  SSN: [id number]    YOB: 1990  Age: 27 y.o. Sex: male      I have evaluated the patient and they are stable and ready for discharge from the PACU. Cardiovascular Function/Vital Signs  Visit Vitals  /67   Pulse 67   Temp 36.5 °C (97.7 °F)   Resp 16   Ht 6' (1.829 m)   Wt 90 kg (198 lb 6.6 oz)   SpO2 95%   BMI 26.91 kg/m²       Patient is status post General anesthesia for Procedure(s):  EXCISION SUBFACIAL SOFT TISSUE NEOPLASM RIGHT CHEEK WITH FACIAL NERVE STTIMULATION. Nausea/Vomiting: None    Postoperative hydration reviewed and adequate. Pain:  Pain Scale 1: Numeric (0 - 10) (08/17/20 1340)  Pain Intensity 1: 0 (08/17/20 1340)   Managed    Neurological Status:   Neuro (WDL): Within Defined Limits (08/17/20 1350)   At baseline    Mental Status, Level of Consciousness: Alert and  oriented to person, place, and time    Pulmonary Status:   O2 Device: Room air (08/17/20 1703)   Adequate oxygenation and airway patent    Complications related to anesthesia: None    Post-anesthesia assessment completed. No concerns    Signed By: Stu Galvez MD     August 17, 2020              Procedure(s):  EXCISION SUBFACIAL SOFT TISSUE NEOPLASM RIGHT CHEEK WITH FACIAL NERVE STTIMULATION. general    <BSHSIANPOST>    INITIAL Post-op Vital signs:   Vitals Value Taken Time   /65 8/17/2020  5:15 PM   Temp 36.5 °C (97.7 °F) 8/17/2020  5:03 PM   Pulse 64 8/17/2020  5:22 PM   Resp 14 8/17/2020  5:22 PM   SpO2 95 % 8/17/2020  5:22 PM   Vitals shown include unvalidated device data.

## 2020-08-17 NOTE — DISCHARGE INSTRUCTIONS
Patient Education     Wound Care: After Your Visit  Your Care Instructions  Taking good care of your wound at home will help it heal quickly and reduce your chance of infection. The doctor has checked you carefully, but problems can develop later. If you notice any problems or new symptoms, get medical treatment right away. Follow-up care is a key part of your treatment and safety. Be sure to make and go to all appointments, and call your doctor if you are having problems. It's also a good idea to know your test results and keep a list of the medicines you take. How can you care for yourself at home? · Clean the area with soap and water 2 times a day unless your doctor gives you different instructions. Don't use hydrogen peroxide or alcohol, which can slow healing. ¨ You may cover the wound with a thin layer of antibiotic ointment, such as bacitracin, and a nonstick bandage. ¨ Apply more ointment and replace the bandage as needed. · Take pain medicines exactly as directed. Some pain is normal with a wound, but do not ignore pain that is getting worse instead of better. You could have an infection. ¨ If the doctor gave you a prescription medicine for pain, take it as prescribed. ¨ If you are not taking a prescription pain medicine, ask your doctor if you can take an over-the-counter medicine. · Your doctor may have closed your wound with stitches (sutures), staples, or skin glue. ¨ If you have stitches, your doctor may remove them after several days to 2 weeks. Or you may have stitches that dissolve on their own. ¨ If you have staples, your doctor may remove them after 7 to 10 days. ¨ If your wound was closed with skin glue, the glue will wear off in a few days to 2 weeks. When should you call for help? Call your doctor now or seek immediate medical care if:  · You have signs of infection, such as:  ¨ Increased pain, swelling, warmth, or redness near the wound.   ¨ Red streaks leading from the wound.  ¨ Pus draining from the wound. ¨ A fever. · You bleed so much from your incision that you soak one or more bandages over 2 to 4 hours. Watch closely for changes in your health, and be sure to contact your doctor if:  · The wound is not getting better each day. Where can you learn more? Go to [employer]  Enter M973 in the search box to learn more about \"Wound Care: After Your Visit. \"   © 0292-9638 Healthwise, Incorporated. Care instructions adapted under license by [facility] (which disclaims liability or warranty for this information). This care instruction is for use with your licensed healthcare professional. If you have questions about a medical condition or this instruction, always ask your healthcare professional. [address] any warranty or liability for your use of this information. Content Version: 28.3.511611; Last Revised: April 23, 2012      ______________________________________________________________________    Anesthesia Discharge Instructions    After general anesthesia or intervenous sedation, for 24 hours or while taking prescription Narcotics:  · Limit your activities  · Do not drive or operate hazardous machinery  · If you have not urinated within 8 hours after discharge, please contact your surgeon on call. · Do not make important personal or business decisions  · Do not drink alcoholic beverages    Report the following to your surgeon:  · Excessive pain, swelling, redness or odor of or around the surgical area  · Temperature over 100.5 degrees  · Nausea and vomiting lasting longer than 4 hours or if unable to take medication  · Any signs of decreased circulation or nerve impairment to extremity:  Change in color, persistent numbness, tingling, coldness or increased pain. · Any questions       Patient Education        Learning About Coronavirus [phone])  Coronavirus [phone]): Overview  What is coronavirus ([id number])?   The coronavirus disease (COVID-19) is caused by a virus. It is an illness that was first found in [facility], in December 2019. It has since spread worldwide. The virus can cause fever, cough, and trouble breathing. In severe cases, it can cause pneumonia and make it hard to breathe without help. It can cause death. Coronaviruses are a large group of viruses. They cause the common cold. They also cause more serious illnesses like Middle East respiratory syndrome (MERS) and severe acute respiratory syndrome (SARS). COVID-19 is caused by a novel coronavirus. That means it's a new type that has not been seen in people before. This virus spreads person-to-person through droplets from coughing and sneezing. It can also spread when you are close to someone who is infected. And it can spread when you touch something that has the virus on it, such as a doorknob or a tabletop. What can you do to protect yourself from coronavirus (COVID-19)? The best way to protect yourself from getting sick is to:  · Avoid areas where there is an outbreak. · Avoid contact with people who may be infected. · Wash your hands often with soap or alcohol-based hand sanitizers. · Avoid crowds and try to stay at least 6 feet away from other people. · Wash your hands often, especially after you cough or sneeze. Use soap and water, and scrub for at least 20 seconds. If soap and water aren't available, use an alcohol-based hand . · Avoid touching your mouth, nose, and eyes. What can you do to avoid spreading the virus to others? To help avoid spreading the virus to others:  · Cover your mouth with a tissue when you cough or sneeze. Then throw the tissue in the trash. · Use a disinfectant to clean things that you touch often. · Wear a cloth face cover if you have to go to public areas. · Stay home if you are sick or have been exposed to the virus. Don't go to school, work, or public areas.  And don't use public transportation, ride-shares, or taxis unless you have no choice. · If you are sick:  ? Leave your home only if you need to get medical care. But call the doctor's office first so they know you're coming. And wear a face cover. ? Wear the face cover whenever you're around other people. It can help stop the spread of the virus when you cough or sneeze. ? Clean and disinfect your home every day. Use household  and disinfectant wipes or sprays. Take special care to clean things that you grab with your hands. These include doorknobs, remote controls, phones, and handles on your refrigerator and microwave. And don't forget countertops, tabletops, bathrooms, and computer keyboards. When to call for help  [id number] anytime you think you may need emergency care. For example, call if:  · You have severe trouble breathing. (You can't talk at all.)  · You have constant chest pain or pressure. · You are severely dizzy or lightheaded. · You are confused or can't think clearly. · Your face and lips have a blue color. · You pass out (lose consciousness) or are very hard to wake up. Call your doctor now if you develop symptoms such as:  · Shortness of breath. · Fever. · Cough. If you need to get care, call ahead to the doctor's office for instructions before you go. Make sure you wear a face cover to prevent exposing other people to the virus. Where can you get the latest information? The following health organizations are tracking and studying this virus. Their websites contain the most up-to-date information. Adrienne Rand also learn what to do if you think you may have been exposed to the virus. · U.S. Centers for Disease Control and Prevention (CDC): The CDC provides updated news about the disease and travel advice. The website also tells you how to prevent the spread of infection. www.cdc.gov  · World Health Organization [facility]): WHO offers information about the virus outbreaks.  WHO also has travel advice. www.who.int  Current as of: May 8, 2020               Content Version: 12.5  © 0454-2662 Healthwise, Incorporated. Care instructions adapted under license by [employer] (which disclaims liability or warranty for this information). If you have questions about a medical condition or this instruction, always ask your healthcare professional. [address] any warranty or liability for your use of this information.

## 2020-08-17 NOTE — ROUTINE PROCESS
Patient: Sanket Nicole MRN: [id number]  SSN: [id number] YOB: 1990  Age: 27 y.o. Sex: male Patient is status post Procedure(s): EXCISION SUBFACIAL SOFT TISSUE NEOPLASM RIGHT CHEEK WITH FACIAL NERVE STTIMULATION. Surgeon(s) and Role: Stephanie Larson MD - Primary Local/Dose/Irrigation:  4 cc lidocaine with epi Peripheral IV 08/17/20 Right Hand (Active) Site Assessment Clean, dry, & intact 08/17/20 1357 Dressing Status Clean, dry, & intact 08/17/20 1357 Dressing Type Transparent 08/17/20 1357 Hub Color/Line Status Infusing 08/17/20 1357 Airway - Endotracheal Tube 08/17/20 Oral (Active) Dressing/Packing:  Wound Face Right-Dressing Type: Cotton ball(s); Non adherent; Other (Comment)(tape) (08/17/20 1500) Splint/Cast:  ] Other:  n/a

## 2020-08-23 NOTE — OP NOTES
[address] [place]   OPERATIVE REPORT    Name:  Raza Thomas  MR#:  [id number]  :  1990  ACCOUNT #:  [de-identified]  DATE OF SERVICE:  2020      PREOPERATIVE DIAGNOSIS:  Right facial sebaceous cyst.    POSTOPERATIVE DIAGNOSIS:  Right facial sebaceous cyst.    PROCEDURE PERFORMED:  1. Excision of benign facial mass; 2.4 cm.  2.  Subfascial, complex closure of right facial wound; 5+ cm.  3.  Facial nerve monitoring x30 minutes. SURGEON:  Saadia Damon MD    ASSISTANT:  No assistant. ANESTHESIA:  General endotracheal tube anesthesia. COMPLICATIONS:  No complication. SPECIMENS REMOVED:  As above. IMPLANTS:  No implant. ESTIMATED BLOOD LOSS:  5 mL. INDICATIONS AND FINDINGS:  The patient had a golf-ball sized mass in the right cheek that had decompressed in the weeks prior to the surgery and had previously been infected, and with ongoing growth and anticipated future infection, and its indications and to the depth of the mass, chose to use facial nerve monitoring to assist in dissection of the high risk of facial nerve involvement with this mass and I discussed this part of the concerned process. DETAILS OF PROCEDURE:  In the operating room, the patient was induced under general endotracheal tube anesthesia following which he was prepped and draped in a sterile fashion. A fusiform was marked out around the overt mass in the right cheek above the nasolabial fold with moderately indurated skin. An incision was made in this area including the soft tissue on the 3-point tension with hooks allowing an extracapsular dissection around the mass as best possible, dissecting in a level of fat necrosis. Dissection deep was continued in the same plane through the fat necrosis above the facial musculature, subfacial SMAS, handed off the mass for the pathology analysis. The facial nerve monitor did not trigger throughout the procedure.   On completion, the wound was irrigated with saline. 4-0 Vicryl was used to close the dead space of the dissection using mattress sutures in the SMAS plane. The skin was then further closed with interrupted 4-0 Vicryl in a deep subcutaneous plane everting the skin edge. Superior and inferior Burow's triangles were excised to prevent standing cone deformity increasing the length of the closure. Interrupted and running locked 4-0 plain gut sutures were then placed to close the skin over which a compression dressing was applied. The patient was then handed over to Anesthesia for awakening and transferred to the recovery room.         MD BENITA [email or web address]  D:  08/23/2020 10:17  T:  08/23/2020 15:57  JOB #:  [id number]  CC:  [facility]

## 2021-08-05 ENCOUNTER — OFFICE VISIT (OUTPATIENT)
Dept: INTERNAL MEDICINE CLINIC | Age: 31
End: 2021-08-05
Payer: COMMERCIAL

## 2021-08-05 VITALS
HEIGHT: 72 IN | TEMPERATURE: 98.8 F | WEIGHT: 181.8 LBS | DIASTOLIC BLOOD PRESSURE: 71 MMHG | OXYGEN SATURATION: 96 % | HEART RATE: 86 BPM | RESPIRATION RATE: 16 BRPM | SYSTOLIC BLOOD PRESSURE: 108 MMHG | BODY MASS INDEX: 24.62 KG/M2

## 2021-08-05 DIAGNOSIS — Z00.00 PHYSICAL EXAM: ICD-10-CM

## 2021-08-05 DIAGNOSIS — G43.009 MIGRAINE WITHOUT AURA AND WITHOUT STATUS MIGRAINOSUS, NOT INTRACTABLE: Primary | ICD-10-CM

## 2021-08-05 PROCEDURE — 99395 PREV VISIT EST AGE 18-39: CPT | Performed by: INTERNAL MEDICINE

## 2021-08-05 NOTE — PROGRESS NOTES
Chief Complaint   Patient presents with    Headache     Patient states that he has been dealing with depression lately, and began having migraine headaches and pain in his shoulder. 1. Have you been to the ER, urgent care clinic since your last visit? Hospitalized since your last visit? No    2. Have you seen or consulted any other health care providers outside of the [address] [place] [address] since your last visit? Include any pap smears or colon screening.  No

## 2021-08-06 LAB
ALBUMIN SERPL-MCNC: 4.6 G/DL (ref 3.5–5)
ALBUMIN/GLOB SERPL: 1.4 {RATIO} (ref 1.1–2.2)
ALP SERPL-CCNC: 62 U/L (ref 45–117)
ALT SERPL-CCNC: 19 U/L (ref 12–78)
ANION GAP SERPL CALC-SCNC: 5 MMOL/L (ref 5–15)
APPEARANCE UR: ABNORMAL
AST SERPL-CCNC: 7 U/L (ref 15–37)
BACTERIA URNS QL MICRO: NEGATIVE /HPF
BASOPHILS # BLD: 0 K/UL (ref 0–0.1)
BASOPHILS NFR BLD: 1 % (ref 0–1)
BILIRUB SERPL-MCNC: 0.5 MG/DL (ref 0.2–1)
BILIRUB UR QL: NEGATIVE
BUN SERPL-MCNC: 8 MG/DL (ref 6–20)
BUN/CREAT SERPL: 7 (ref 12–20)
CALCIUM SERPL-MCNC: 9.7 MG/DL (ref 8.5–10.1)
CHLORIDE SERPL-SCNC: 108 MMOL/L (ref 97–108)
CHOLEST SERPL-MCNC: 167 MG/DL
CO2 SERPL-SCNC: 26 MMOL/L (ref 21–32)
COLOR UR: ABNORMAL
CREAT SERPL-MCNC: 1.2 MG/DL (ref 0.7–1.3)
DIFFERENTIAL METHOD BLD: ABNORMAL
EOSINOPHIL # BLD: 0.1 K/UL (ref 0–0.4)
EOSINOPHIL NFR BLD: 1 % (ref 0–7)
EPITH CASTS URNS QL MICRO: ABNORMAL /LPF
ERYTHROCYTE [DISTWIDTH] IN BLOOD BY AUTOMATED COUNT: 12.7 % (ref 11.5–14.5)
GLOBULIN SER CALC-MCNC: 3.2 G/DL (ref 2–4)
GLUCOSE SERPL-MCNC: 70 MG/DL (ref 65–100)
GLUCOSE UR STRIP.AUTO-MCNC: NEGATIVE MG/DL
HCT VFR BLD AUTO: 44.9 % (ref 36.6–50.3)
HDLC SERPL-MCNC: 62 MG/DL
HDLC SERPL: 2.7 {RATIO} (ref 0–5)
HGB BLD-MCNC: 14.5 G/DL (ref 12.1–17)
HGB UR QL STRIP: NEGATIVE
HYALINE CASTS URNS QL MICRO: ABNORMAL /LPF (ref 0–5)
IMM GRANULOCYTES # BLD AUTO: 0 K/UL (ref 0–0.04)
IMM GRANULOCYTES NFR BLD AUTO: 0 % (ref 0–0.5)
KETONES UR QL STRIP.AUTO: ABNORMAL MG/DL
LDLC SERPL CALC-MCNC: 92.2 MG/DL (ref 0–100)
LEUKOCYTE ESTERASE UR QL STRIP.AUTO: ABNORMAL
LYMPHOCYTES # BLD: 2.4 K/UL (ref 0.8–3.5)
LYMPHOCYTES NFR BLD: 53 % (ref 12–49)
MCH RBC QN AUTO: 27.6 PG (ref 26–34)
MCHC RBC AUTO-ENTMCNC: 32.3 G/DL (ref 30–36.5)
MCV RBC AUTO: 85.5 FL (ref 80–99)
MONOCYTES # BLD: 0.4 K/UL (ref 0–1)
MONOCYTES NFR BLD: 9 % (ref 5–13)
NEUTS SEG # BLD: 1.6 K/UL (ref 1.8–8)
NEUTS SEG NFR BLD: 36 % (ref 32–75)
NITRITE UR QL STRIP.AUTO: NEGATIVE
NRBC # BLD: 0 K/UL (ref 0–0.01)
NRBC BLD-RTO: 0 PER 100 WBC
PH UR STRIP: 5.5 [PH] (ref 5–8)
PLATELET # BLD AUTO: 284 K/UL (ref 150–400)
PMV BLD AUTO: 10.4 FL (ref 8.9–12.9)
POTASSIUM SERPL-SCNC: 4 MMOL/L (ref 3.5–5.1)
PROT SERPL-MCNC: 7.8 G/DL (ref 6.4–8.2)
PROT UR STRIP-MCNC: NEGATIVE MG/DL
RBC # BLD AUTO: 5.25 M/UL (ref 4.1–5.7)
RBC #/AREA URNS HPF: ABNORMAL /HPF (ref 0–5)
SODIUM SERPL-SCNC: 139 MMOL/L (ref 136–145)
SP GR UR REFRACTOMETRY: 1.02 (ref 1–1.03)
TRIGL SERPL-MCNC: 64 MG/DL (ref ?–150)
UROBILINOGEN UR QL STRIP.AUTO: 0.2 EU/DL (ref 0.2–1)
VLDLC SERPL CALC-MCNC: 12.8 MG/DL
WBC # BLD AUTO: 4.4 K/UL (ref 4.1–11.1)
WBC URNS QL MICRO: ABNORMAL /HPF (ref 0–4)

## 2021-08-06 NOTE — PROGRESS NOTES
Chief Complaint   Patient presents with    Headache     Patient states that he has been dealing with depression lately, and began having migraine headaches and pain in his shoulder. .      SUBECTIVE:    Aquiles Albert is a 32 y.o. male comes in after a long absence complaining of recurring headaches. The most recent one started 48 hours ago, and he had pain on the left side of his scalp area and just adjacent to the orbit. This subsequently began to occur in areas of both orbits. This is not associated with any other symptoms, and within a relatively short time, presumably minutes, it abated only to recur again. He is quite sensitized because his younger sister had a CNS problem, presumably aneurysm. He also states his mother  from an aneurysm in her 35s. He is currently unemployed. Current Outpatient Medications   Medication Sig Dispense Refill    ascorbic acid, vitamin C, (Vitamin C) 250 mg tablet Take 250 mg by mouth daily.  (Patient not taking: Reported on 2021)       Past Medical History:   Diagnosis Date    Migraine     Psychiatric disorder     DEPRESSION AND ANXIETY    Psychotic disorder ([facility] [address]     Requiring hospitalization short-term------ age 32     Past Surgical History:   Procedure Laterality Date    HX ORTHOPAEDIC      REATTACHMENT LEFT PECTORAL MUSCLE     No Known Allergies    REVIEW OF SYSTEMS:  Review of Systems - Negative except   ENT ROS: negative for - headaches, hearing change, nasal congestion, oral lesions, tinnitus, visual changes or   Respiratory ROS: no cough, shortness of breath, or wheezing  Cardiovascular ROS: no chest pain or dyspnea on exertion  Gastrointestinal ROS: no abdominal pain, change in bowel habits, or black or blood  Genito-Urinary ROS: no dysuria, trouble voiding, or hematuria  Musculoskeletal ROS: negative  Neurological ROS: no TIA or stroke symptoms      Social History     Socioeconomic History    Marital status:      Spouse name: Not on file    Number of children: 3    Years of education: Not on file    Highest education level: Not on file   Occupational History    Occupation: Unemployed   Tobacco Use    Smoking status: Former Smoker    Smokeless tobacco: Never Used   Vaping Use    Vaping Use: Never used   Substance and Sexual Activity    Alcohol use: Yes     Comment: social    Drug use: Yes     Types: Marijuana    Sexual activity: Yes     Partners: Female     Birth control/protection: Condom   Social History Narrative    ** Merged History Encounter **          Social Determinants of Health     Financial Resource Strain:     Difficulty of Paying Living Expenses:    Food Insecurity:     Worried About Running Out of Food in the Last Year:     [address] in the Last Year:    Transportation Needs:     Lack of Transportation (Medical):  Lack of Transportation (Non-Medical):    Physical Activity:     Days of Exercise per Week:     Minutes of Exercise per Session:    Stress:     Feeling of Stress :    Social Connections:     Frequency of Communication with Friends and Family:     Frequency of Social Gatherings with Friends and Family:     Attends [employer] Services:     Active Member of Clubs or Organizations:     Attends Club or Organization Meetings:     Marital Status:    r  Family History   Problem Relation Age of Onset    Seizures Mother        OBJECTIVE:  Visit Vitals  /71   Pulse 86   Temp 98.8 °F (37.1 °C) (Oral)   Resp 16   Ht 6' (1.829 m)   Wt 181 lb 12.8 oz (82.5 kg)   SpO2 96%   BMI 24.66 kg/m²     ENT: perrla,  eom intact  NECK: supple. Thyroid normal, no JVD  CHEST: clear to ascultation and percussion   HEART: regular rate and rhythm  ABD: soft, bowel sounds active,   EXTREMITIES: no edema, pulse 1+  INTEGUMENT: clear  Neurologic: No focality to the exam      ASSESSMENT:  1. Migraine without aura and without status migrainosus, not intractable    2. Physical exam        PLAN:  1.  The patient's headaches are quite atypical and not classic migraine headaches. We will refer to a neurologist.  He really wants this because of the concern about his other family members and he may have a similar process contributing to the current headaches. 2. He otherwise appears to be quite healthy. I will await the results of his lab work. .  Orders Placed This Encounter    CBC WITH AUTOMATED DIFF    LIPID PANEL    METABOLIC PANEL, COMPREHENSIVE    URINALYSIS W/ RFLX MICROSCOPIC    [address] [facility]       Follow-up and Dispositions    · Return if symptoms worsen or fail to improve.            Judah Tadeo MD

## 2021-08-09 ENCOUNTER — OFFICE VISIT (OUTPATIENT)
Dept: NEUROLOGY | Age: 31
End: 2021-08-09
Payer: COMMERCIAL

## 2021-08-09 VITALS
BODY MASS INDEX: 24.52 KG/M2 | DIASTOLIC BLOOD PRESSURE: 78 MMHG | SYSTOLIC BLOOD PRESSURE: 122 MMHG | HEART RATE: 71 BPM | RESPIRATION RATE: 16 BRPM | WEIGHT: 181 LBS | HEIGHT: 72 IN | OXYGEN SATURATION: 99 %

## 2021-08-09 DIAGNOSIS — G43.109 CHRONIC MIGRAINE WITH AURA: ICD-10-CM

## 2021-08-09 DIAGNOSIS — Z82.49 FAMILY HISTORY OF BRAIN ANEURYSM: ICD-10-CM

## 2021-08-09 DIAGNOSIS — G44.52 NEW DAILY PERSISTENT HEADACHE: Primary | ICD-10-CM

## 2021-08-09 DIAGNOSIS — F32.A DEPRESSION, UNSPECIFIED DEPRESSION TYPE: ICD-10-CM

## 2021-08-09 PROCEDURE — 99204 OFFICE O/P NEW MOD 45 MIN: CPT | Performed by: PSYCHIATRY & NEUROLOGY

## 2021-08-09 RX ORDER — NORTRIPTYLINE HYDROCHLORIDE 10 MG/1
CAPSULE ORAL
Qty: 60 CAPSULE | Refills: 1
Start: 2021-08-09 | End: 2022-03-23

## 2021-08-09 NOTE — PROGRESS NOTES
Chief Complaint   Patient presents with    Migraine     had migraine when he was younger due to a concussion, however they have gone away for years/ recently they have come back in the past couple months/ sharp pain in head that lasts 30 minutes      Visit Vitals  /78 (BP 1 Location: Right arm, BP Patient Position: Sitting)   Pulse 71   Resp 16   Ht 6' (1.829 m)   Wt 181 lb (82.1 kg)   SpO2 99%   BMI 24.55 kg/m²

## 2021-08-09 NOTE — LETTER
8/9/2021 10:06 AM    Patient:  Sathish Chavez   YOB: 1990  Date of Visit: 8/9/2021      Dear Gerardo Godinez MD  [place] [address]  [place] [address] Meredith Stevenson: Thank you for referring Mr. Sathish Chavez to me for evaluation/treatment. Below are the relevant portions of my assessment and plan of care. If you have questions, please do not hesitate to call me. I look forward to following Mr. Massimo Small along with you.         Sincerely,      Shayne Simms MD

## 2021-08-09 NOTE — PROGRESS NOTES
Chief Complaint   Patient presents with    Migraine     had migraine when he was younger due to a concussion, however they have gone away for years/ recently they have come back in the past couple months/ sharp pain in head that lasts 30 minutes        HISTORY OF PRESENT ILLNESS  Ricky Espinoza is a 32 y.o. male with a longstanding history of migraine headaches. He started having migraines in first grade and says that these started after he had a concussion. His typical migraines would involve blurring of vision followed by a unilateral sharp throbbing headache associated with light sensitivity and nausea. They could last all day. He was having them quite frequently until about 2017 when he significantly changed his diet and that helped. About 2 months ago he started experiencing headaches again and the headaches he is getting now are different. He describes this as a sharp, stabbing pain that could come up on any part of the head, can last several minutes  and then gradually subsides. It happens every day. There is no associated photophobia, phonophobia, nausea or vomiting. 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 His mother passed away from an aneurysm rupture several years ago. His sister has also had cerebral vascular issues including blood clots and had a stroke at a young age. Reports some stress in his personal life and feels mildly depressed. Past Medical History:   Diagnosis Date    Migraine     Psychiatric disorder     DEPRESSION AND ANXIETY    Psychotic disorder ([facility] [address]     Requiring hospitalization short-term------ age 32     Current Outpatient Medications   Medication Sig    nortriptyline (PAMELOR) 10 mg capsule 1 qhs x 1 wk, then 2 qhs    ascorbic acid, vitamin C, (Vitamin C) 250 mg tablet Take 250 mg by mouth daily. No current facility-administered medications for this visit.      No Known Allergies  Family History   Problem Relation Age of Onset    Seizures Mother      Social History     Tobacco Use    Smoking status: Former Smoker    Smokeless tobacco: Never Used   Vaping Use    Vaping Use: Never used   Substance Use Topics    Alcohol use: Yes     Comment: social    Drug use: Yes     Types: Marijuana     Past Surgical History:   Procedure Laterality Date    HX ORTHOPAEDIC  2020    REATTACHMENT LEFT PECTORAL MUSCLE         REVIEW OF SYSTEMS  Review of Systems - History obtained from the patient  Psychological ROS: negative  ENT ROS: negative  Hematological and Lymphatic ROS: negative  Endocrine ROS: negative  Respiratory ROS: no cough, shortness of breath, or wheezing  Cardiovascular ROS: no chest pain or dyspnea on exertion  Gastrointestinal ROS: no abdominal pain, change in bowel habits, or black or bloody stools  Genito-Urinary ROS: no dysuria, trouble voiding, or hematuria  Musculoskeletal ROS: negative  Dermatological ROS: negative      PHYSICAL EXAMINATION:    Visit Vitals  /78 (BP 1 Location: Right arm, BP Patient Position: Sitting)   Pulse 71   Resp 16   Ht 6' (1.829 m)   Wt 181 lb (82.1 kg)   SpO2 99%   BMI 24.55 kg/m²     General:  Well nourished and groomed individual in no acute distress. Neck: Supple, nontender, no bruits, no pain with resistance to active range of motion. Heart: Regular rate and rhythm. Normal S1S2. Lungs:  Equal chest expansion, no cough, no wheeze  Musculoskeletal:  Extremities revealed no edema and had full range of motion of joints. Psych:  Good mood and bright affect    NEUROLOGICAL EXAMINATION:     Mental Status:   Alert and oriented to person, place, and time with recent and remote memory intact. Attention span and concentration are normal. Speech is fluent. Cranial Nerves:    II, III, IV, VI:  Visual acuity grossly intact. Visual fields are normal.    Pupils are equal, round, and reactive to light and accommodation.     Extra-ocular movements are full and fluid. Fundoscopic exam was benign, no ptosis or nystagmus. V-XII: Hearing is grossly intact. Facial features are symmetric, with normal sensation and strength. The palate rises symmetrically and the tongue protrudes midline. Sternocleidomastoids 5/5. Motor Examination: Normal tone, bulk, and strength. 5/5 muscle strength throughout. No cogwheel rigidity or clonus present. Sensory exam:  Normal throughout to pinprick, temperature, and vibration sense. Normal proprioception. Coordination:  Finger to nose and rapid arm movement testing was normal.   No resting or intention tremor    Gait and Station:  Steady while walking on toes, heels, and with tandem walking. Normal arm swing. No Rhomberg or pronator drift. No muscle wasting or fasiculations noted. Reflexes:  DTRs 2+ throughout. Toes downgoing. LABS / IMAGING  Lab Results   Component Value Date/Time    WBC 4.4 08/05/2021 04:39 PM    HGB 14.5 08/05/2021 04:39 PM    HCT 44.9 08/05/2021 04:39 PM    PLATELET 265 56/39/3471 04:39 PM    MCV 85.5 08/05/2021 04:39 PM     Lab Results   Component Value Date/Time    Sodium 139 08/05/2021 04:39 PM    Potassium 4.0 08/05/2021 04:39 PM    Chloride 108 08/05/2021 04:39 PM    CO2 26 08/05/2021 04:39 PM    Anion gap 5 08/05/2021 04:39 PM    Glucose 70 08/05/2021 04:39 PM    BUN 8 08/05/2021 04:39 PM    Creatinine 1.20 08/05/2021 04:39 PM    BUN/Creatinine ratio 7 (L) 08/05/2021 04:39 PM    GFR est AA >60 08/05/2021 04:39 PM    GFR est non-AA >60 08/05/2021 04:39 PM    Calcium 9.7 08/05/2021 04:39 PM    Bilirubin, total 0.5 08/05/2021 04:39 PM    Alk.  phosphatase 62 08/05/2021 04:39 PM    Protein, total 7.8 08/05/2021 04:39 PM    Albumin 4.6 08/05/2021 04:39 PM    Globulin 3.2 08/05/2021 04:39 PM    A-G Ratio 1.4 08/05/2021 04:39 PM    ALT (SGPT) 19 08/05/2021 04:39 PM    AST (SGOT) 7 (L) 08/05/2021 04:39 PM         ASSESSMENT    ICD-10-CM ICD-9-CM    1. New daily persistent headache G44.52 339.42 MRI BRAIN W WO CONT      MRA BRAIN W WO CONT   2. Family history of brain aneurysm  Z82.49 V17.1 MRA BRAIN W WO CONT   3. Chronic migraine with aura  G43.109 346.00 nortriptyline (PAMELOR) 10 mg capsule   4. Depression, unspecified depression type  F32.9 311 nortriptyline (PAMELOR) 10 mg capsule       DISCUSSION  Mr. Alessia Oliveira has a longstanding history of migraines with aura but fortunately he is not getting them much now   For the past 2 months he has had a different type of headache, on a daily basis. While this may be a type of primary headache disorder such as stabbing headache but we need to rule out a structural cause. We will proceed with MRI brain to include MRA as well given strong family history of cerebral aneurysms  We will empirically try him on nortriptyline 10 mg at bedtime for prophylaxis and it should help with mood as well  Follow periodically    Thank you for allowing me to participate in the care of Mr. Bonnie Petty. Please feel free to contact me if you have any questions. I will be happy to follow to follow him along with you.     Xander Blank MD  Diplomate, American Board of Psychiatry & Neurology (Neurology)  [facility] Board of Psychiatry & Neurology (Clinical Neurophysiology)  Diplomate, American Board of Electrodiagnostic Medicine

## 2022-03-23 ENCOUNTER — HOSPITAL ENCOUNTER (EMERGENCY)
Age: 32
Discharge: HOME | End: 2022-03-23
Attending: EMERGENCY MEDICINE
Payer: COMMERCIAL

## 2022-03-23 VITALS
DIASTOLIC BLOOD PRESSURE: 87 MMHG | BODY MASS INDEX: 26.82 KG/M2 | OXYGEN SATURATION: 99 % | SYSTOLIC BLOOD PRESSURE: 141 MMHG | TEMPERATURE: 98.5 F | HEART RATE: 85 BPM | RESPIRATION RATE: 16 BRPM | WEIGHT: 198 LBS | HEIGHT: 72 IN

## 2022-03-23 DIAGNOSIS — Z23 NEED FOR TETANUS BOOSTER: ICD-10-CM

## 2022-03-23 DIAGNOSIS — S91.332A PUNCTURE WOUND OF LEFT FOOT, INITIAL ENCOUNTER: Primary | ICD-10-CM

## 2022-03-23 PROCEDURE — 99284 EMERGENCY DEPT VISIT MOD MDM: CPT

## 2022-03-23 PROCEDURE — 90471 IMMUNIZATION ADMIN: CPT

## 2022-03-23 PROCEDURE — 74011250636 HC RX REV CODE- 250/636: Performed by: PHYSICIAN ASSISTANT

## 2022-03-23 PROCEDURE — 90715 TDAP VACCINE 7 YRS/> IM: CPT | Performed by: PHYSICIAN ASSISTANT

## 2022-03-23 RX ORDER — ESCITALOPRAM OXALATE 20 MG/1
20 TABLET ORAL DAILY
COMMUNITY

## 2022-03-23 RX ORDER — CIPROFLOXACIN 500 MG/1
500 TABLET ORAL 2 TIMES DAILY
Qty: 14 TABLET | Refills: 0 | Status: SHIPPED | OUTPATIENT
Start: 2022-03-23 | End: 2022-03-30

## 2022-03-23 RX ORDER — NAPROXEN 500 MG/1
500 TABLET ORAL
Qty: 20 TABLET | Refills: 0 | Status: SHIPPED | OUTPATIENT
Start: 2022-03-23

## 2022-03-23 RX ADMIN — TETANUS TOXOID, REDUCED DIPHTHERIA TOXOID AND ACELLULAR PERTUSSIS VACCINE, ADSORBED 0.5 ML: 5; 2.5; 8; 8; 2.5 SUSPENSION INTRAMUSCULAR at 12:50

## 2022-03-23 NOTE — DISCHARGE INSTRUCTIONS
Keep wound clean and dry, elevate to decrease swelling. Complete antibiotics as prescribed. Follow up with primary care for recheck. Return to the Emergency Dept for any concerns.

## 2022-03-23 NOTE — LETTER
[facility] EMERGENCY DEPT  [address] [id number] [phone]    Work/School Note    Date: 3/23/2022    To Whom It May concern:      Jared Natanael was seen and treated today in the emergency room. He may return to work in 1 to 2 days, as symptoms improve.          Sincerely,          Julianne Estrella

## 2022-03-23 NOTE — ED PROVIDER NOTES
EMERGENCY DEPARTMENT HISTORY AND PHYSICAL EXAM      Date: 3/23/2022  Patient Name: Braulio Saeed    History of Presenting Illness     Chief Complaint   Patient presents with    Wound Check       History Provided By: Patient    HPI: Braulio Saeed, 28 y.o. male presents ambulatory to the emergency department with complaint of puncture wound to the base of the left foot right below the fifth toe. He was able to remove the nail. He states he stepped on a nail extending from decking. He states it went through the shoe. He has had minimal bleeding. He has been able to ambulate with some discomfort. Said no numbness, tingling, weakness. He rates his pain an 8 out of 10 on the pain scale and describes it as a constant throbbing sensation. He is a former smoker. Pt is o/w healthy without fever, chills, cough, congestion, ST, shortness of breath, chest pain, N/V/D. Pt reports last tetanus was either in 2016 or 2017. He is a former smoker. There are no other complaints, changes, or physical findings at this time. PCP: Xin Lezama MD    Current Outpatient Medications   Medication Sig Dispense Refill    escitalopram oxalate (Lexapro) 20 mg tablet Take 20 mg by mouth daily.  ciprofloxacin HCl (Cipro) 500 mg tablet Take 1 Tablet by mouth two (2) times a day for 7 days. 14 Tablet 0    naproxen (NAPROSYN) 500 mg tablet Take 1 Tablet by mouth every twelve (12) hours as needed for Pain.  20 Tablet 0       Past History     Past Medical History:  Past Medical History:   Diagnosis Date    Migraine     Psychiatric disorder     DEPRESSION AND ANXIETY    Psychotic disorder ([facility] [address]     Requiring hospitalization short-term------ age 32       Past Surgical History:  Past Surgical History:   Procedure Laterality Date    HX ORTHOPAEDIC  2020    REATTACHMENT LEFT PECTORAL MUSCLE       Family History:  Family History   Problem Relation Age of Onset    Seizures Mother        Social History:  Social History Tobacco Use    Smoking status: Former Smoker    Smokeless tobacco: Never Used   Vaping Use    Vaping Use: Never used   Substance Use Topics    Alcohol use: Yes     Comment: social    Drug use: Yes     Types: Marijuana       Allergies:  No Known Allergies      Review of Systems   Review of Systems   Constitutional: Negative for chills and fever. HENT: Negative for congestion, rhinorrhea and sore throat. Respiratory: Negative for cough and shortness of breath. Cardiovascular: Negative for chest pain and palpitations. Gastrointestinal: Negative for diarrhea, nausea and vomiting. Endocrine: Negative for polydipsia, polyphagia and polyuria. Genitourinary: Negative for dysuria and hematuria. Musculoskeletal: Negative for arthralgias, joint swelling, neck pain and neck stiffness. Skin: Positive for wound. Negative for color change, pallor and rash. Allergic/Immunologic: Negative for food allergies and immunocompromised state. Neurological: Negative for weakness and numbness. Hematological: Negative for adenopathy. Does not bruise/bleed easily. Psychiatric/Behavioral: Negative for agitation and confusion. All other systems reviewed and are negative. Physical Exam   Physical Exam  Vitals and nursing note reviewed. Constitutional:       General: He is not in acute distress. Appearance: Normal appearance. He is well-developed and normal weight. He is not ill-appearing, toxic-appearing or diaphoretic. HENT:      Head: Normocephalic and atraumatic. Nose: Nose normal.   Eyes:      General: No scleral icterus. Right eye: No discharge. Left eye: No discharge. Conjunctiva/sclera: Conjunctivae normal.   Neck:      Thyroid: No thyromegaly. Vascular: No JVD. Trachea: No tracheal deviation. Cardiovascular:      Rate and Rhythm: Normal rate and regular rhythm. Pulses: Normal pulses. Heart sounds: Normal heart sounds.    Pulmonary:      Effort: Pulmonary effort is normal. No respiratory distress. Breath sounds: Normal breath sounds. No wheezing. Musculoskeletal:         General: Swelling and tenderness present. Cervical back: Normal range of motion and neck supple. Comments: See SKIN exam   Skin:     General: Skin is warm and dry. Coloration: Skin is not pale. Findings: No bruising, erythema or rash. Comments: Superficial puncture wound with associated soft tissue swelling noted to the base of the L 5th toe, no active bleeding, no palpable fb, NVI, sensation grossly intact to light touch. Pt observed to ambulate with a limp. No significant erythema or warmth. Full A/P ROM noted. Neurological:      General: No focal deficit present. Mental Status: He is alert and oriented to person, place, and time. Sensory: No sensory deficit. Motor: No weakness or abnormal muscle tone. Coordination: Coordination normal.   Psychiatric:         Mood and Affect: Mood normal.         Behavior: Behavior normal.         Judgment: Judgment normal.         Diagnostic Study Results     Labs -   No results found for this or any previous visit (from the past 12 hour(s)). Radiologic Studies -   No orders to display         Medical Decision Making   I am the first provider for this patient. I reviewed the vital signs, available nursing notes, past medical history, past surgical history, family history and social history. Vital Signs-Reviewed the patient's vital signs. Patient Vitals for the past 12 hrs:   Temp Pulse Resp BP SpO2   03/23/22 1235 98.5 °F (36.9 °C) 85 16 (!) 141/87 99 %           Records Reviewed: Nursing Notes, Old Medical Records, Previous Radiology Studies and Previous Laboratory Studies    Provider Notes (Medical Decision Making):   Puncture wound, possible pseudomonas, wound check    ED Course:   Initial assessment performed.  The patients presenting problems have been discussed, and they are in agreement with the care plan formulated and outlined with them. I have encouraged them to ask questions as they arise throughout their visit. Pt declined xrays. DISCHARGE NOTE:  1:02 PM  The care plan has been outline with the patient and/or family, who verbally conveyed understanding and agreement. Available results have been reviewed. Patient and/or family understand the follow up plan as outlined and discharge instructions. Should their condition deterioration at any time after discharge the patient agrees to return, follow up sooner than outlined or seek medical assistance at the closest Emergency Room as soon as possible. Questions have been answered. Discharge instructions and educational information regarding the patient's diagnosis as well a list of reasons why the patient would want to seek immediate medical attention, should their condition change, were reviewed directly with the patient/family          PLAN:  1. Discharge Medication List as of 3/23/2022  1:17 PM      START taking these medications    Details   ciprofloxacin HCl (Cipro) 500 mg tablet Take 1 Tablet by mouth two (2) times a day for 7 days. , Normal, Disp-14 Tablet, R-0      naproxen (NAPROSYN) 500 mg tablet Take 1 Tablet by mouth every twelve (12) hours as needed for Pain., Normal, Disp-20 Tablet, R-0         CONTINUE these medications which have NOT CHANGED    Details   escitalopram oxalate (Lexapro) 20 mg tablet Take 20 mg by mouth daily. , Historical Med           2. Follow-up Information     Follow up With Specialties Details Why Contact Info    Luis Diego MD Internal Medicine   [address] [address]  [facility] [phone]      [facility] EMERGENCY DEPT Emergency Medicine  If symptoms worsen [address] [facility]  [phone]        Return to ED if worse     Diagnosis     Clinical Impression:   1. Puncture wound of left foot, initial encounter    2.  Need for tetanus booster

## 2022-03-23 NOTE — ED NOTES
Pt presents to ED ambulatory complaining of wound to left foot after stepping on a nail. Pt is alert and oriented x 4, RR even and unlabored, skin is warm and dry. Assessment completed and pt updated on plan of care. Call bell in reach. Emergency Department Nursing Plan of Care       The Nursing Plan of Care is developed from the Nursing assessment and Emergency Department Attending provider initial evaluation. The plan of care may be reviewed in the ED Provider note.     The Plan of Care was developed with the following considerations:   Patient / Family readiness to learn indicated by:verbalized understanding  Persons(s) to be included in education: patient  Barriers to Learning/Limitations:No    Signed     Thiago Brooks RN    3/23/2022   12:46 PM

## (undated) DEVICE — SOLUTION SCRB 2OZ 10% POVIDONE IOD ANTIMIC BTL

## (undated) DEVICE — INFECTION CONTROL KIT SYS

## (undated) DEVICE — PACK PROCEDURE SURG ENT CUST

## (undated) DEVICE — PAD,NON-ADHERENT,3X8,STERILE,LF,1/PK: Brand: MEDLINE

## (undated) DEVICE — STRAP,POSITIONING,KNEE/BODY,FOAM,4X60": Brand: MEDLINE

## (undated) DEVICE — BIPOLAR FORCEPS CORD: Brand: VALLEYLAB

## (undated) DEVICE — SPONGE GZ W4XL4IN COT 12 PLY TYP VII WVN C FLD DSGN

## (undated) DEVICE — SUT PLN 5-0 18IN P3 YEL --

## (undated) DEVICE — SUTURE ABSORBABLE BRAIDED 4-0 P-3 18 IN UD VICRYL J494G

## (undated) DEVICE — SURGICAL PROCEDURE PACK BASIN MAJ SET CUST NO CAUT

## (undated) DEVICE — NEEDLE HYPO 25GA L1.5IN BLU POLYPR HUB S STL REG BVL STR

## (undated) DEVICE — GARMENT,MEDLINE,DVT,INT,CALF,MED, GEN2: Brand: MEDLINE

## (undated) DEVICE — GOWN,AURORA,NON-REINFORCED,2XL: Brand: MEDLINE

## (undated) DEVICE — ELECTRODE 8227410 PAIRED 2 CH SET ROHS

## (undated) DEVICE — SYR 10ML CTRL LR LCK NSAF LF --

## (undated) DEVICE — SOLUTION IV 1000ML 0.9% SOD CHL

## (undated) DEVICE — 40418 TRENDELENBURG ONE-STEP ARM PROTECTORS LARGE (1 PAIR): Brand: 40418 TRENDELENBURG ONE-STEP ARM PROTECTORS LARGE (1 PAIR)

## (undated) DEVICE — TUBING SUCT 10FR MAL ALUM SHFT FN CAP VENT UNIV CONN W/ OBT

## (undated) DEVICE — ELECTRO LUBE IS A SINGLE PATIENT USE DEVICE THAT IS INTENDED TO BE USED ON ELECTROSURGICAL ELECTRODES TO REDUCE STICKING.: Brand: KEY SURGICAL ELECTRO LUBE